# Patient Record
Sex: MALE | HISPANIC OR LATINO | URBAN - METROPOLITAN AREA
[De-identification: names, ages, dates, MRNs, and addresses within clinical notes are randomized per-mention and may not be internally consistent; named-entity substitution may affect disease eponyms.]

---

## 2020-10-18 ENCOUNTER — EMERGENCY (EMERGENCY)
Facility: HOSPITAL | Age: 35
LOS: 0 days | Discharge: HOME | End: 2020-10-19
Attending: EMERGENCY MEDICINE
Payer: MEDICAID

## 2020-10-18 VITALS — HEIGHT: 69 IN | WEIGHT: 177.91 LBS

## 2020-10-18 DIAGNOSIS — R10.9 UNSPECIFIED ABDOMINAL PAIN: ICD-10-CM

## 2020-10-18 DIAGNOSIS — B34.9 VIRAL INFECTION, UNSPECIFIED: ICD-10-CM

## 2020-10-18 DIAGNOSIS — R11.2 NAUSEA WITH VOMITING, UNSPECIFIED: ICD-10-CM

## 2020-10-18 DIAGNOSIS — R19.7 DIARRHEA, UNSPECIFIED: ICD-10-CM

## 2020-10-18 LAB
APPEARANCE UR: CLEAR — SIGNIFICANT CHANGE UP
BILIRUB UR-MCNC: NEGATIVE — SIGNIFICANT CHANGE UP
COLOR SPEC: YELLOW — SIGNIFICANT CHANGE UP
DIFF PNL FLD: NEGATIVE — SIGNIFICANT CHANGE UP
GLUCOSE UR QL: NEGATIVE MG/DL — SIGNIFICANT CHANGE UP
KETONES UR-MCNC: ABNORMAL
LEUKOCYTE ESTERASE UR-ACNC: ABNORMAL
NITRITE UR-MCNC: NEGATIVE — SIGNIFICANT CHANGE UP
PH UR: 7 — SIGNIFICANT CHANGE UP (ref 5–8)
PROT UR-MCNC: 15 MG/DL
SP GR SPEC: 1.01 — SIGNIFICANT CHANGE UP (ref 1.01–1.02)
UROBILINOGEN FLD QL: NEGATIVE MG/DL — SIGNIFICANT CHANGE UP

## 2020-10-18 PROCEDURE — 81001 URINALYSIS AUTO W/SCOPE: CPT

## 2020-10-18 PROCEDURE — 87086 URINE CULTURE/COLONY COUNT: CPT

## 2020-10-18 PROCEDURE — 99283 EMERGENCY DEPT VISIT LOW MDM: CPT

## 2020-10-18 PROCEDURE — U0003: CPT

## 2020-10-18 NOTE — ED PROVIDER NOTE - NSFOLLOWUPCLINICS_GEN_ALL_ED_FT
[facility]  Family Medicine  [address] [place] [address]  Phone: [phone]  Fax:   Follow Up Time: 4-6 Days

## 2020-10-18 NOTE — ED PROVIDER NOTE - PATIENT PORTAL LINK FT
You can access the FollowMyHealth Patient Portal offered by [facility] by registering at the following website: [email or web address]. By joining [employer]’s FollowMyHealth portal, you will also be able to view your health information using other applications (apps) compatible with our system.

## 2020-10-18 NOTE — ED PROVIDER NOTE - CLINICAL SUMMARY MEDICAL DECISION MAKING FREE TEXT BOX
Patient with no comorbidities well appearing with +COVID contact at home, COVID swab, UA to evaluate for urinary symptoms, return precautions given.

## 2020-10-18 NOTE — ED ADULT NURSE NOTE - OBJECTIVE STATEMENT
Pt presents to er with complaints of flu like symptoms, states his wife is covid pos and wants testing, pt observed in stretcher respirations unlabored, able to ambulate with steady gait at this time.

## 2020-10-18 NOTE — ED PROVIDER NOTE - NSFOLLOWUPINSTRUCTIONS_ED_ALL_ED_FT
Enfermedades virales en los adultos  (Viral Illness, Adult)    Los virus son microbios diminutos que entran en el organismo de citlaly persona y causan enfermedades. Hay muchos tipos de virus diferentes y causan muchas clases de enfermedades. Las enfermedades virales pueden ser leves o graves. Pueden afectar diferentes partes del cuerpo.    Las enfermedades frecuentes causadas por virus incluyen los resfríos y la gripe. Además, las enfermedades virales abarcan codi clínicos graves, toribio el VIH/sida (virus de inmunodeficiencia humana/síndrome de inmunodeficiencia adquirida). Se carlton identificado unos pocos virus asociados con determinados tipos de cáncer.    ¿CUÁLES SON LAS CAUSAS?  Muchos tipos de virus pueden causar enfermedades. Los virus invaden las células del organismo, se multiplican y provocan la disfunción o la muerte de las células infectadas. Cuando la célula muere, libera más virus. Cuando esto ocurre, aparecen síntomas de la enfermedad, y el virus sigue diseminándose a otras células. Si el virus asume la función de la célula, puede hacer que esta se divida y crezca fuera de control, y fredy es el reji en el que un virus causa cáncer.    Los diferentes virus ingresan al organismo de distintas formas. Puede contraer un virus de la siguiente manera:    Al ingerir alimentos o beber agua contaminados con el virus.  Al inhalar gotitas que citlaly persona infectada liberó en el aire al toser o estornudar.  Al tocar citlaly superficie contaminada con el virus y luego llevarse la mano a la boca, la nariz o los ojos.  Al ser quinn por un insecto o mordido por un animal que son portadores del virus.  Al tener contacto sexual con citlaly persona infectada por el virus.  Al tener contacto con elizabeth o líquidos que contienen el virus, ya sea a través de un ba abierto o jamie citlaly transfusión.    Si el virus ingresa al organismo, el sistema de defensa del cuerpo (sistema inmunitario) intentará combatirlo. Puede correr un riesgo más alto de tener citlaly enfermedad viral si tiene el sistema inmunitario debilitado.    ¿CUÁLES SON LOS SIGNOS O LOS SÍNTOMAS?  Los síntomas varían en función del tipo de virus y de la ubicación de las células que fredy invade. Los síntomas frecuentes de los principales tipos de enfermedades virales incluyen los siguientes:    Virus del resfrío y de la gripe    Fiebre.  Dolor de jenelle.  Dolor de garganta.  Mala musculares.  Congestión nasal.  Tos.    Virus del aparato digestivo (gastrointestinales)    Fiebre.  Dolor abdominal.  Náuseas.  Diarrea.    Virus hepáticos (hepatitis)    Pérdida del apetito.  Cansancio.  Keagan amarillento de la piel (ictericia).    Virus del cerebro y la médula dowling    Fiebre.  Dolor de jenelle.  Rigidez en el dunia.  Náuseas y vómitos.  Confusión o somnolencia.    Virus de la piel    Verrugas.  Picazón.  Erupción cutánea.    Virus de transmisión sexual    Secreción.  Hinchazón.  Enrojecimiento.  Erupción cutánea.    ¿CÓMO SE TRATA ESTA AFECCIÓN?  Los virus pueden ser difíciles de tratar porque se hospedan en las células. Los antibióticos no tratan los virus porque no llegan al interior de las células. El tratamiento de citlaly enfermedad viral puede incluir lo siguiente:    Descansar y beber abundantes líquidos.  Medicamentos para aliviar los síntomas. Estos pueden incluir medicamentos de venta ruben para el dolor y la fiebre, medicamentos para la tos o la congestión y medicamentos para aliviar la diarrea.  Medicamentos antivirales. Estos fármacos están disponibles únicamente para determinados tipos de virus. Pueden ayudar a aliviar los síntomas de la gripe, si se ankush apenas comienza el cuadro. También hay antivirales para la hepatitis y el VIH/sida.    Algunas enfermedades virales pueden evitarse con vacunas. Un ejemplo frecuente es la vacuna antigripal.    SIGA ESTAS INDICACIONES EN THOMAS CASA:  Medicamentos    [place] los medicamentos de venta ruben y los recetados solamente toribio se lo haya indicado el médico.   Si le recetaron un antiviral, tómelo toribio se lo haya indicado el médico. No deje de farnaz los medicamentos aunque comience a sentirse mejor.  Infórmese sobre cuándo los antibióticos son necesarios y cuándo no. Los antibióticos no combaten a los virus. Si el médico coleman que usted puede tener citlaly infección bacteriana así toribio citlaly viral, penny vez le receten un antibiótico.  No solicite citlaly receta de antibióticos si le carlton diagnosticado citlaly enfermedad viral. Eso no hará que la enfermedad pase más rápidamente.  Farnaz antibióticos con frecuencia cuando no son necesarios puede derivar en resistencia a los antibióticos. Cuando esto ocurre, el medicamento pierde thomas eficacia contra las bacterias que normalmente combate.    Instrucciones generales    Michelle suficiente líquido para mantener la orina brandon o de color amarillo pálido.  Descanse todo lo que pueda.  Retome evangelina actividades normales toribio se lo haya indicado el médico. Pregúntele al médico qué actividades son seguras para usted.  Concurra a todas las visitas de control toribio se lo haya indicado el médico. [place] es importante.    ¿CÓMO SE NOEMY ESTO?  [place] estas medidas para reducir el riesgo de tener citlaly infección viral:     Consuma citlaly dieta fabiana y descanse mucho.  Lávese las jeffrey frecuentemente con agua y jabón. [place] es especialmente importante cuando está en lugares públicos. Use desinfectante para jeffrey si no dispone de agua y jabón.  Evite el contacto cercano con amigos y familiares que tengan citlaly infección viral.  Si viaja a las regiones donde las infecciones virales gastrointestinales son frecuentes, no tome agua ni coma alimentos crudos.  Manténgase al día con las vacunas. Vacúnese contra la gripe todos los años toribio se lo haya indicado el médico.  No comparta cepillos de dientes, cortaúñas, rasuradoras o agujas con otras personas.  Siempre tenga sexo con protección.    COMUNÍQUESE CON UN MÉDICO SI:  Tiene síntomas de citlaly enfermedad viral que no desaparecen.  Los síntomas regresan después de baldomero desaparecido.  Los síntomas empeoran.    SOLICITE AYUDA DE INMEDIATO SI:  Tiene dificultad para respirar.  Siente un dolor de jenelle intenso o rigidez en el dunia.  Tiene vómitos jackie o dolor abdominal.    NOTAS ADICIONALES E INSTRUCCIONES    Please follow up with your Primary MD in 24-48 hr.  Seek immediate medical care for any new/worsening signs or symptoms.

## 2020-10-19 VITALS
TEMPERATURE: 100 F | DIASTOLIC BLOOD PRESSURE: 60 MMHG | HEART RATE: 79 BPM | OXYGEN SATURATION: 97 % | SYSTOLIC BLOOD PRESSURE: 107 MMHG | RESPIRATION RATE: 19 BRPM

## 2020-10-19 LAB — SARS-COV-2 RNA SPEC QL NAA+PROBE: SIGNIFICANT CHANGE UP

## 2020-10-19 RX ORDER — IBUPROFEN 200 MG
600 TABLET ORAL ONCE
Refills: 0 | Status: COMPLETED | OUTPATIENT
Start: 2020-10-19 | End: 2020-10-19

## 2020-10-19 RX ADMIN — Medication 600 MILLIGRAM(S): at 00:27

## 2020-10-20 LAB
CULTURE RESULTS: NO GROWTH — SIGNIFICANT CHANGE UP
SPECIMEN SOURCE: SIGNIFICANT CHANGE UP

## 2020-11-17 ENCOUNTER — OUTPATIENT (OUTPATIENT)
Dept: OUTPATIENT SERVICES | Facility: HOSPITAL | Age: 35
LOS: 1 days | End: 2020-11-17
Payer: MEDICARE

## 2020-11-17 DIAGNOSIS — Z11.59 ENCOUNTER FOR SCREENING FOR OTHER VIRAL DISEASES: ICD-10-CM

## 2020-11-17 PROBLEM — Z78.9 OTHER SPECIFIED HEALTH STATUS: Chronic | Status: ACTIVE | Noted: 2020-10-19

## 2020-11-17 LAB — SARS-COV-2 RNA SPEC QL NAA+PROBE: SIGNIFICANT CHANGE UP

## 2020-11-17 PROCEDURE — U0003: CPT

## 2020-11-18 DIAGNOSIS — Z11.59 ENCOUNTER FOR SCREENING FOR OTHER VIRAL DISEASES: ICD-10-CM

## 2021-10-22 PROBLEM — Z00.00 ENCOUNTER FOR PREVENTIVE HEALTH EXAMINATION: Status: ACTIVE | Noted: 2021-10-22

## 2021-11-08 ENCOUNTER — OUTPATIENT (OUTPATIENT)
Dept: OUTPATIENT SERVICES | Facility: HOSPITAL | Age: 36
LOS: 1 days | Discharge: HOME | End: 2021-11-08

## 2021-11-08 DIAGNOSIS — D75.1 SECONDARY POLYCYTHEMIA: ICD-10-CM

## 2021-11-09 ENCOUNTER — APPOINTMENT (OUTPATIENT)
Dept: HEMATOLOGY ONCOLOGY | Facility: CLINIC | Age: 36
End: 2021-11-09
Payer: MEDICAID

## 2021-11-09 ENCOUNTER — LABORATORY RESULT (OUTPATIENT)
Age: 36
End: 2021-11-09

## 2021-11-09 ENCOUNTER — RESULT REVIEW (OUTPATIENT)
Age: 36
End: 2021-11-09

## 2021-11-09 VITALS
BODY MASS INDEX: 23.62 KG/M2 | SYSTOLIC BLOOD PRESSURE: 122 MMHG | HEART RATE: 81 BPM | HEIGHT: 70.08 IN | TEMPERATURE: 98.3 F | DIASTOLIC BLOOD PRESSURE: 67 MMHG | WEIGHT: 165 LBS

## 2021-11-09 DIAGNOSIS — D75.1 SECONDARY POLYCYTHEMIA: ICD-10-CM

## 2021-11-09 LAB
BASOPHILS # BLD AUTO: 0.01 K/UL — SIGNIFICANT CHANGE UP (ref 0–0.2)
BASOPHILS NFR BLD AUTO: 0.2 % — SIGNIFICANT CHANGE UP (ref 0–2)
EOSINOPHIL # BLD AUTO: 0.24 K/UL — SIGNIFICANT CHANGE UP (ref 0–0.5)
EOSINOPHIL NFR BLD AUTO: 4.5 % — SIGNIFICANT CHANGE UP (ref 0–6)
HCT VFR BLD CALC: 50.6 % — HIGH (ref 39–50)
HGB BLD-MCNC: 17.3 G/DL — HIGH (ref 13–17)
IMM GRANULOCYTES NFR BLD AUTO: 0.2 % — SIGNIFICANT CHANGE UP (ref 0–1.5)
LYMPHOCYTES # BLD AUTO: 2.12 K/UL — SIGNIFICANT CHANGE UP (ref 1–3.3)
LYMPHOCYTES # BLD AUTO: 40.2 % — SIGNIFICANT CHANGE UP (ref 13–44)
MCHC RBC-ENTMCNC: 29.7 PG — SIGNIFICANT CHANGE UP (ref 27–34)
MCHC RBC-ENTMCNC: 34.2 GM/DL — SIGNIFICANT CHANGE UP (ref 32–36)
MCV RBC AUTO: 86.8 FL — SIGNIFICANT CHANGE UP (ref 80–100)
MONOCYTES # BLD AUTO: 0.4 K/UL — SIGNIFICANT CHANGE UP (ref 0–0.9)
MONOCYTES NFR BLD AUTO: 7.6 % — SIGNIFICANT CHANGE UP (ref 2–14)
NEUTROPHILS # BLD AUTO: 2.5 K/UL — SIGNIFICANT CHANGE UP (ref 1.8–7.4)
NEUTROPHILS NFR BLD AUTO: 47.3 % — SIGNIFICANT CHANGE UP (ref 43–77)
NRBC # BLD: 0 /100 WBCS — SIGNIFICANT CHANGE UP (ref 0–0)
PLATELET # BLD AUTO: 219 K/UL — SIGNIFICANT CHANGE UP (ref 150–400)
RBC # BLD: 5.83 M/UL — HIGH (ref 4.2–5.8)
RBC # FLD: 12.2 % — SIGNIFICANT CHANGE UP (ref 10.3–14.5)
WBC # BLD: 5.28 K/UL — SIGNIFICANT CHANGE UP (ref 3.8–10.5)
WBC # FLD AUTO: 5.28 K/UL — SIGNIFICANT CHANGE UP (ref 3.8–10.5)

## 2021-11-09 PROCEDURE — 99203 OFFICE O/P NEW LOW 30 MIN: CPT

## 2021-11-09 NOTE — HISTORY OF PRESENT ILLNESS
[de-identified] : This 35-year-old male was referred for evaluation of erythrocytosis.\par On September 20, 2021 his hemoglobin was 18 g and hematocrit 52.4.  The white count was 7.4 and the platelet count 247,000.\par In our office today hemoglobin 17.3, hematocrit 50.6.\par Patient feels completely well.\par He is a non-smoker with no history of lung disease.

## 2021-11-09 NOTE — ASSESSMENT
[FreeTextEntry1] : Mild erythrocytosis in a 35-year-old asymptomatic male.\par JAK2 V6 17 F mutation analysis was sent from our office today to exclude the remote possibility of polycythemia vera.\par No further work-up required.\par We'll follow up with results of JAK2.\par

## 2023-01-03 ENCOUNTER — APPOINTMENT (OUTPATIENT)
Dept: DERMATOLOGY | Facility: CLINIC | Age: 38
End: 2023-01-03
Payer: MEDICAID

## 2023-01-03 ENCOUNTER — NON-APPOINTMENT (OUTPATIENT)
Age: 38
End: 2023-01-03

## 2023-01-03 DIAGNOSIS — Z78.9 OTHER SPECIFIED HEALTH STATUS: ICD-10-CM

## 2023-01-03 DIAGNOSIS — B07.9 VIRAL WART, UNSPECIFIED: ICD-10-CM

## 2023-01-03 PROCEDURE — 17110 DESTRUCTION B9 LES UP TO 14: CPT

## 2024-03-28 NOTE — ED ADULT TRIAGE NOTE - INTERNATIONAL TRAVEL
No PRINCIPAL DISCHARGE DIAGNOSIS  Diagnosis: Acute osteomyelitis  Assessment and Plan of Treatment: Osteomyelitis is an infection in a bone. Infections can reach a bone by traveling through the bloodstream or spreading from nearby tissue. Infections can also begin in the bone itself if an injury exposes the bone to germs.  You will continue IV antibiotics through your PICC line for 6 weeks. Please follow up with MURTAZA Archer for further management and monitoring of your labwork after discharge.      SECONDARY DISCHARGE DIAGNOSES  Diagnosis: Anemia  Assessment and Plan of Treatment: When you came to the hospital you were found to have anemia. Anemia is the medical term for when a person has too few red blood cells. Red blood cells carry oxygen throughout your body; if you have too few red blood cells, your body is not able to get all the oxygen it needs. Most people with anemia have no symptoms, however common symptoms include: increased fatigue, shortness of breath, tiring easily, and headaches. If you experience any of these symptoms, or have any episodes of bloody vomit or bloody stool (can be red stool or black stool), please see your primary care provider or go to your emergency room for further evaluation.